# Patient Record
Sex: FEMALE | Race: AMERICAN INDIAN OR ALASKA NATIVE
[De-identification: names, ages, dates, MRNs, and addresses within clinical notes are randomized per-mention and may not be internally consistent; named-entity substitution may affect disease eponyms.]

---

## 2017-01-31 NOTE — MAMMOGRAPHY REPORT
RIGHT DIGITAL DIAGNOSTIC MAMMOGRAM : 01/31/17 09:27:00



CLINICAL: Recalled for asymmetry.



COMPARISON:12/27/16 screening



FINDINGS: Spot compression MLO and CC views were performed and 

demonstrated persistent upper-outer focal asymmetry. 



Ultrasound of the right breast was performed and demonstrated a cluster 

of cysts at 10 o'clock 12 cm from the nipple measuring 6 x 3 x 5 mm.  

It correlates with the mammographic density.





IMPRESSION: Benign cysts right breast.



BI-RADS CATEGORY:  2 - - Benign



RECOMMENDATION: Routine mammographic screening in one year.



ACR BI-RADS MAMMOGRAPHIC CODES:

0 = Needs additional imaging evaluation; 1 = Negative; 2 = Benign; 3 = 

Probably benign; 4 = Suspicious; 5 = Malignant; 6 = Known biopsy-proven 

malignancy



COMMENT:

      1.   Dense breast tissue, i.e., adenosis, fibrocystic 

            changes, etc., may obscure an underlying neoplasm.

      2.   Approximately 10% of cancers are not detected with

            mammography.

      3.   A negative mammography report should not delay biopsy 

            if a clinically suspicious mass is present.





COMMENT:

Patient follow-up letters are generated via our [employer] 

application.

## 2017-01-31 NOTE — ULTRASOUND REPORT
RIGHT DIGITAL DIAGNOSTIC MAMMOGRAM : 01/31/17 09:27:00



CLINICAL: Recalled for asymmetry.



COMPARISON:12/27/16 screening



FINDINGS: Spot compression MLO and CC views were performed and 

demonstrated persistent upper-outer focal asymmetry. 



Ultrasound of the right breast was performed and demonstrated a cluster 

of cysts at 10 o'clock 12 cm from the nipple measuring 6 x 3 x 5 mm.  

It correlates with the mammographic density.





IMPRESSION: Benign cysts right breast.



BI-RADS CATEGORY:  2 - - Benign



RECOMMENDATION: Routine mammographic screening in one year.



ACR BI-RADS MAMMOGRAPHIC CODES:

0 = Needs additional imaging evaluation; 1 = Negative; 2 = Benign; 3 = 

Probably benign; 4 = Suspicious; 5 = Malignant; 6 = Known biopsy-proven 

malignancy



COMMENT:

      1.   Dense breast tissue, i.e., adenosis, fibrocystic 

            changes, etc., may obscure an underlying neoplasm.

      2.   Approximately 10% of cancers are not detected with

            mammography.

      3.   A negative mammography report should not delay biopsy 

            if a clinically suspicious mass is present.





COMMENT:

Patient follow-up letters are generated via our [employer] 

application.

## 2017-06-05 NOTE — EMERGENCY DEPARTMENT REPORT
ED Upper Extremity Inj HPI





- General


Chief Complaint: Extremity Injury, Upper


Stated Complaint: Finger Lac


Time Seen by Provider: 06/05/17 09:15


Source: patient


Mode of arrival: Ambulatory


Limitations: No Limitations





- History of Present Illness


MD Complaint: Injury to:: left, finger


-: Sudden, This morning


Other Extremity Injury: Fingers: Left (1 cm lack medial aspect second finger)


Other Injuries: none


Place: work





- Related Data


 Allergies











Allergy/AdvReac Type Severity Reaction Status Date / Time


 


No Known Allergies Allergy   Verified 06/05/17 09:49














ED Review of Systems


ROS: 


Stated complaint: Finger Lac


Other details as noted in HPI





Constitutional: denies: chills, fever


Eyes: denies: eye pain, eye discharge, vision change


ENT: denies: ear pain, throat pain


Respiratory: denies: cough, shortness of breath, wheezing


Cardiovascular: denies: chest pain, palpitations


Endocrine: no symptoms reported


Gastrointestinal: denies: abdominal pain, nausea, diarrhea


Genitourinary: denies: urgency, dysuria, discharge


Musculoskeletal: denies: back pain, joint swelling, arthralgia


Skin: other (left second finger laceration).  denies: rash, lesions


Neurological: denies: headache, weakness, paresthesias


Psychiatric: denies: anxiety, depression


Hematological/Lymphatic: denies: easy bleeding, easy bruising





ED Past Medical Hx





- Past Medical History


Previous Medical History?: Yes


Hx Hypertension: Yes


Additional medical history: thyroid problems





- Surgical History


Past Surgical History?: Yes


Additional Surgical History: small bowel obstruction with lysisi of adhesions, 

Hysterectomy, Left knee surgery





- Social History


Smoking Status: Never Smoker


Substance Use Type: Alcohol, Prescribed





ED Physical Exam





- General


Limitations: No Limitations


General appearance: alert, in no apparent distress





- Head


Head exam: Present: atraumatic, normocephalic





- Eye


Eye exam: Present: normal appearance, PERRL, EOMI





- ENT


ENT exam: Present: mucous membranes moist





- Neck


Neck exam: Present: normal inspection





- Respiratory


Respiratory exam: Present: normal lung sounds bilaterally.  Absent: respiratory 

distress, wheezes, rales, rhonchi, stridor





- Cardiovascular


Cardiovascular Exam: Present: regular rate.  Absent: systolic murmur, diastolic 

murmur, rubs, gallop





- GI/Abdominal


GI/Abdominal exam: Present: soft, normal bowel sounds





- Extremities Exam


Extremities exam: Present: normal inspection





- Expanded Upper Extremity Exam


  ** Left


Hand Wrist exam: Present: other (left index finger laceration medial aspect 

neurovascular motor intact, brisk cap refill noted no deformity noted.)


Hand L/R Front: 


  __________________________














  __________________________





 1 - Positive: laceration








- Back Exam


Back exam: Present: normal inspection





- Neurological Exam


Neurological exam: Present: alert, oriented X3





- Psychiatric


Psychiatric exam: Present: normal affect, normal mood





- Skin


Skin exam: Present: warm, dry, intact, normal color.  Absent: rash





ED Course


 Vital Signs











  06/05/17





  09:05


 


Temperature 97.8 F


 


Pulse Rate 97 H


 


Respiratory 18





Rate 


 


Blood Pressure 126/82


 


O2 Sat by Pulse 98





Oximetry 














- Laceration /Wound Repair


  ** Left Medial Finger


Wound's Depth, Shape: superficial


Wound Explored: clean


Betadine Prep?: Yes


Anesthesia: 1% Lidocaine


Volume Anesthetic (ccs): 6


Wound Repaired With: sutures


Suture Size/Type: 4:0, proline


Number of Sutures: 1


Layer Closure?: No


Sterile Dressing Applied?: Yes


Critical care attestation.: 


If time is entered above; I have spent that time in minutes in the direct care 

of this critically ill patient, excluding procedure time.








ED Disposition


Clinical Impression: 


 Laceration of left index finger





Disposition: DISCHARGED TO HOME OR SELFCARE


Is pt being admited?: No


Condition: Stable


Instructions:  Suture Care (ED)


Referrals: 


PRIMARY CARE,MD [Primary Care Provider] - 3-5 Days


Forms:  Work/School Release Form(ED)

## 2017-06-05 NOTE — XRAY REPORT
LEFT HAND RADIOGRAPHS



INDICATION: Left hand index finger injury, slammed in a door.



COMPARISON: None similar.



FINDINGS: AP, lateral and oblique left hand radiographs demonstrate 

intact bones, joints and soft tissues.



CONCLUSION: No acute radiographic abnormality.



Thank you for the opportunity to participate in this patient's care.

## 2017-12-28 ENCOUNTER — HOSPITAL ENCOUNTER (OUTPATIENT)
Dept: HOSPITAL 5 - SPVWC | Age: 52
Discharge: HOME | End: 2017-12-28
Attending: INTERNAL MEDICINE
Payer: COMMERCIAL

## 2017-12-28 DIAGNOSIS — I10: ICD-10-CM

## 2017-12-28 DIAGNOSIS — Z12.31: Primary | ICD-10-CM

## 2017-12-28 PROCEDURE — 77067 SCR MAMMO BI INCL CAD: CPT

## 2017-12-28 PROCEDURE — G0202 SCR MAMMO BI INCL CAD: HCPCS

## 2017-12-29 ENCOUNTER — HOSPITAL ENCOUNTER (OUTPATIENT)
Dept: HOSPITAL 5 - GIO | Age: 52
Discharge: HOME | End: 2017-12-29
Attending: INTERNAL MEDICINE
Payer: COMMERCIAL

## 2017-12-29 VITALS — SYSTOLIC BLOOD PRESSURE: 132 MMHG | DIASTOLIC BLOOD PRESSURE: 65 MMHG

## 2017-12-29 DIAGNOSIS — K64.8: ICD-10-CM

## 2017-12-29 DIAGNOSIS — I10: ICD-10-CM

## 2017-12-29 DIAGNOSIS — E03.9: ICD-10-CM

## 2017-12-29 DIAGNOSIS — K63.89: ICD-10-CM

## 2017-12-29 DIAGNOSIS — K57.30: ICD-10-CM

## 2017-12-29 DIAGNOSIS — Z79.899: ICD-10-CM

## 2017-12-29 DIAGNOSIS — Z12.11: Primary | ICD-10-CM

## 2017-12-29 PROCEDURE — 82962 GLUCOSE BLOOD TEST: CPT

## 2017-12-29 PROCEDURE — 45378 DIAGNOSTIC COLONOSCOPY: CPT

## 2017-12-29 NOTE — MAMMOGRAPHY REPORT
BILATERAL DIGITAL SCREENING MAMMOGRAM with CAD: 12/28/17 08:02:00



CLINICAL: Routine screening.



COMPARISON:01/31/17, 12/27/16 and 08/26/11



FINDINGS: The breasts are mostly fatty with bilateral upper outer 

residual fibroglandular densities.  New right upper outer parenchymal 

asymmetries require additional evaluation.No architectural distortion 

or suspicious calcifications.The left breast is negative.



IMPRESSION:  New right asymmetries requiring further workup.  



BI-RADS CATEGORY:  0 -- Additional Imaging Evaluation Required



RECOMMENDATION: Recall for right mediolateral , spot magnification  CC 

and MLO views and right breast ultrasound if needed.



ACR BI-RADS MAMMOGRAPHIC CODES:

0 = Needs additional imaging evaluation; 1 = Negative; 2 = Benign; 3 = 

Probably benign; 4 = Suspicious; 5 = Malignant; 6 = Known biopsy-proven 

malignancy



COMMENT:

      1.   Dense breast tissue, i.e., adenosis, fibrocystic 

            changes, etc., may obscure an underlying neoplasm.

      2.   Approximately 10% of cancers are not detected with

            mammography.

      3.   A negative mammography report should not delay biopsy 

            if a clinically suspicious mass is present.



COMMENT:

Patient follow-up letters are generated via our [employer] application.

## 2017-12-29 NOTE — OPERATIVE REPORT
PRIMARY CARE:  Claire Ladd DO



PROCEDURE:  Colonoscopy.



INDICATION:  Colon cancer screening.



MEDICATIONS:  Propofol per CRNA.



COMPLICATIONS:  None.



DESCRIPTION OF PROCEDURE:  The patient brought to procedure suite.  The patient

had the procedure discussed with her at length.  All risks, complications, and

benefits discussed after which the patient signed for the procedure performed. 

The patient was placed in left lateral decubitus position.  Rectal exam

performed prior to insertion of the scope.  After adequate sedation medication

as above, scope was inserted in the rectum and brought to the level of the

cecum.  Ileocecal valve, appendiceal orifice and cecal strap adequately

visualized.  Colonoscope was then removed and mucosa of colon visualized.  Prep

quality for this procedure was fair.  The patient's vital signs remained stable

throughout the procedure.



FINDINGS:  There were no mass lesions or polyps noted during this procedure. 

There was noted to be severe melanosis coli throughout the colon.  There were

few small sigmoid diverticula noted.  Retroflexion view performed in the rectum

showed small to medium internal hemorrhoids.  The patient tolerated the

procedure well.  No complications during the procedure.



IMPRESSION:

1. Melanosis coli.

2. Diverticulosis.

3. Internal hemorrhoids.



RECOMMENDATIONS:

1. High fiber diet.

2. Continue current medications.

3. Repeat colonoscopy screening in 10 years.





DD: 12/29/2017 14:15

DT: 12/29/2017 20:49

JOB# [id number]  [id number]

CAB/NTS

## 2017-12-29 NOTE — ANESTHESIA CONSULTATION
Anesthesia Consult and Med Hx


Date of service: 12/29/17





- Airway


Anesthetic Teeth Evaluation: Good


ROM Head & Neck: Adequate


Mental/Hyoid Distance: Adequate


Mallampati Class: Class I


Intubation Access Assessment: Good





- Pulmonary Exam


CTA: Yes





- Cardiac Exam


Cardiac Exam: RRR





- Pre-Operative Health Status


ASA Pre-Surgery Classification: ASA3


Proposed Anesthetic Plan: MAC





- Pulmonary


Hx Smoking: No


Hx Sleep Apnea: No





- Cardiovascular System


Hx Hypertension: Yes





- Central Nervous System


Hx Psychiatric Problems: No





- Endocrine


Hx Non-Insulin Dependent Diabetes: Yes


Hx Hypothyroidism: Yes





- Hematic


Hx Anemia: Yes (RESOLVED, ONLY DURING PREGNANCY)





- Other Systems


Hx Cancer: No

## 2018-02-09 ENCOUNTER — HOSPITAL ENCOUNTER (OUTPATIENT)
Dept: HOSPITAL 5 - SPVWC | Age: 53
Discharge: HOME | End: 2018-02-09
Attending: INTERNAL MEDICINE
Payer: COMMERCIAL

## 2018-02-09 DIAGNOSIS — R92.8: Primary | ICD-10-CM

## 2018-02-09 NOTE — MAMMOGRAPHY REPORT
RIGHT DIGITAL DIAGNOSTIC MAMMOGRAM : 02/09/18 09:30:00



CLINICAL: Recalled for asymmetry.



COMPARISON:12/28/17 screening



FINDINGS: Lateralmedial and spot magnification MLO the CC views were 

performed. Satisfactory effacement of upper outer parenchymal asymmetry 

on the spot views and negative lateral view.





IMPRESSION: Negative Mammogram.



BI-RADS CATEGORY:  1 -- Negative



RECOMMENDATION: Routine mammographic screening in one year.



ACR BI-RADS MAMMOGRAPHIC CODES:

0 = Needs additional imaging evaluation; 1 = Negative; 2 = Benign; 3 = 

Probably benign; 4 = Suspicious; 5 = Malignant; 6 = Known biopsy-proven 

malignancy



COMMENT:

      1.   Dense breast tissue, i.e., adenosis, fibrocystic 

            changes, etc., may obscure an underlying neoplasm.

      2.   Approximately 10% of cancers are not detected with

            mammography.

      3.   A negative mammography report should not delay biopsy 

            if a clinically suspicious mass is present.





COMMENT:

Patient follow-up letters are generated via our [employer] 

application.

## 2018-05-14 ENCOUNTER — HOSPITAL ENCOUNTER (OUTPATIENT)
Dept: HOSPITAL 5 - SLR | Age: 53
Discharge: HOME | End: 2018-05-14
Attending: OTOLARYNGOLOGY
Payer: COMMERCIAL

## 2018-05-14 DIAGNOSIS — G47.30: Primary | ICD-10-CM

## 2018-05-14 PROCEDURE — 95810 POLYSOM 6/> YRS 4/> PARAM: CPT

## 2018-10-21 ENCOUNTER — HOSPITAL ENCOUNTER (EMERGENCY)
Dept: HOSPITAL 5 - ED | Age: 53
Discharge: HOME | End: 2018-10-21
Payer: COMMERCIAL

## 2018-10-21 VITALS — DIASTOLIC BLOOD PRESSURE: 90 MMHG | SYSTOLIC BLOOD PRESSURE: 127 MMHG

## 2018-10-21 DIAGNOSIS — E11.9: ICD-10-CM

## 2018-10-21 DIAGNOSIS — Z90.710: ICD-10-CM

## 2018-10-21 DIAGNOSIS — I10: ICD-10-CM

## 2018-10-21 DIAGNOSIS — M17.12: Primary | ICD-10-CM

## 2018-10-21 DIAGNOSIS — Z91.048: ICD-10-CM

## 2018-10-21 PROCEDURE — 99283 EMERGENCY DEPT VISIT LOW MDM: CPT

## 2018-10-21 NOTE — XRAY REPORT
FINAL REPORT



EXAM:  XR KNEE 3V LT



HISTORY:  left knee pain/pop sensation 



TECHNIQUE:  Three views of the left knee 



PRIORS:  None.



FINDINGS:  

The bones are normally aligned and mineralized. There is

prominent osteophyte formation of the patellofemoral and medial

joints. There is mild narrowing of the medial joint and moderate

narrowing of the patellofemoral joint. There is beaking of the

tibial spines. There is no evidence of acute fracture. The soft

tissues are unremarkable.



IMPRESSION:  

No evidence of acute fracture or subluxation.



Osteoarthrosis of the medial and patellofemoral joints.

## 2018-10-21 NOTE — EMERGENCY DEPARTMENT REPORT
ED Lower Extremity HPI





- General


Chief Complaint: Extremity Injury, Lower


Stated Complaint: LEFT KNEE PAIN


Time Seen by Provider: 10/21/18 17:55


Source: patient


Mode of arrival: Ambulatory


Limitations: No Limitations





- History of Present Illness


Initial Comments: 





Patient reports left knee pain with a "popping sensation" that gave out this 

morning





- Related Data


 Home Medications











 Medication  Instructions  Recorded  Confirmed  Last Taken


 


Thyroid,Pork [Nature-Throid] 90 mg PO QDAY 12/29/17 12/29/17 12/28/17 14:00


 


Triamterene/Hydrochlorothiazid 1 each PO DAILY 12/29/17 12/29/17 12/28/17 14:00





[Triamterene-Hctz 37.5-25 mg Cp]    


 


amLODIPine [Norvasc] 1 tab PO DAILY 12/29/17 12/29/17 12/28/17 14:00








 Previous Rx's











 Medication  Instructions  Recorded  Last Taken  Type


 


Acetaminophen [Tylenol Extra 1 gm PO Q6H #30 tablet 10/21/18 Unknown Rx





Strength]    











 Allergies











Allergy/AdvReac Type Severity Reaction Status Date / Time


 


adhesive tape Allergy  Rash Verified 10/21/18 17:14














ED Review of Systems


ROS: 


Stated complaint: LEFT KNEE PAIN


Other details as noted in HPI





Constitutional: denies: chills, fever


Eyes: denies: eye pain, eye discharge, vision change


ENT: denies: ear pain, throat pain


Respiratory: denies: cough, shortness of breath, wheezing


Cardiovascular: denies: chest pain, palpitations


Endocrine: no symptoms reported


Gastrointestinal: denies: abdominal pain, nausea, diarrhea


Genitourinary: denies: urgency, dysuria, discharge


Musculoskeletal: arthralgia (left knee).  denies: back pain, joint swelling


Skin: denies: rash, lesions


Neurological: denies: headache, weakness, paresthesias


Psychiatric: denies: anxiety, depression


Hematological/Lymphatic: denies: easy bleeding, easy bruising





ED Past Medical Hx





- Past Medical History


Hx Hypertension: Yes


Hx Diabetes: Yes (type II, no meds 2007)


Additional medical history: thyroid problems, Arthritis





- Surgical History


Additional Surgical History: small bowel obstruction with lysisi of adhesions, 

Hysterectomy, Left knee surgery





- Social History


Smoking Status: Never Smoker


Substance Use Type: Alcohol





- Medications


Home Medications: 


 Home Medications











 Medication  Instructions  Recorded  Confirmed  Last Taken  Type


 


Thyroid,Pork [Nature-Throid] 90 mg PO QDAY 12/29/17 12/29/17 12/28/17 14:00 

History


 


Triamterene/Hydrochlorothiazid 1 each PO DAILY 12/29/17 12/29/17 12/28/17 14:00 

History





[Triamterene-Hctz 37.5-25 mg Cp]     


 


amLODIPine [Norvasc] 1 tab PO DAILY 12/29/17 12/29/17 12/28/17 14:00 History


 


Acetaminophen [Tylenol Extra 1 gm PO Q6H #30 tablet 10/21/18  Unknown Rx





Strength]     














ED Physical Exam





- General


Limitations: No Limitations


General appearance: alert, in no apparent distress





- Neck


Neck exam: Present: normal inspection, full ROM.  Absent: tenderness, 

meningismus, lymphadenopathy, thyromegaly





- Respiratory


Respiratory exam: Present: normal lung sounds bilaterally.  Absent: respiratory 

distress, wheezes, rales, rhonchi, stridor, chest wall tenderness, accessory 

muscle use, decreased breath sounds, prolonged expiratory





- Cardiovascular


Cardiovascular Exam: Present: regular rate, normal rhythm, normal heart sounds.

  Absent: systolic murmur, diastolic murmur, rubs, gallop





- Extremities Exam


Extremities exam: Present: normal inspection, full ROM, normal capillary 

refill.  Absent: tenderness, pedal edema, joint swelling





- Expanded Lower Extremity Exam


  ** Left


Hip exam: Present: normal inspection, full ROM, pelvic stability


Upper Leg exam: Present: normal inspection, full ROM


Knee exam: Present: full ROM, tenderness (left medial), swelling, full knee 

extension.  Absent: abrasion, laceration, ecchymosis, deformity, crepidus, 

dislocation, erythema, posterior draw sign, pain/laxity with valgus, pain/

laxity with varus


Lower Leg exam: Present: normal inspection, full ROM


Ankle exam: Present: normal inspection, full ROM


Foot/Toe exam: Present: normal inspection, full ROM


Neuro vascular tendon exam: Present: no vascular compromise, significant pain 

with passive ROM of distal joint.  Absent: pulse deficit, abnormal cap refill, 

motor deficit, sensory deficit, tendon deficit, extremity cold to touch, pallor

, abnormal 2-point discrimination, decreased fine/light touch, foot drop, 

peroneal nerve deficit


Gait: Positive: observed and limited by pain





- Back Exam


Back exam: Present: normal inspection, full ROM.  Absent: tenderness, CVA 

tenderness (R), CVA tenderness (L)





- Neurological Exam


Neurological exam: Present: alert, oriented X3, CN II-XII intact, normal gait, 

reflexes normal.  Absent: motor sensory deficit





- Psychiatric


Psychiatric exam: Present: normal affect, normal mood





- Skin


Skin exam: Present: warm, dry, intact, normal color.  Absent: rash





ED Course


 Vital Signs











  10/21/18 10/21/18





  17:15 20:12


 


Temperature 98.3 F 98.1 F


 


Pulse Rate 83 86


 


Respiratory 18 18





Rate  


 


Blood Pressure 128/78 


 


Blood Pressure  127/90





[Left]  


 


O2 Sat by Pulse 98 99





Oximetry  














ED Lower Extremity MDM





- Lab Data








 Vital Signs











  10/21/18 10/21/18





  17:15 20:12


 


Temperature 98.3 F 98.1 F


 


Pulse Rate 83 86


 


Respiratory 18 18





Rate  


 


Blood Pressure 128/78 


 


Blood Pressure  127/90





[Left]  


 


O2 Sat by Pulse 98 99





Oximetry  














- Radiology Data


Radiology results: image reviewed





EXAM: XR KNEE 3V LT 





 HISTORY: left knee pain/pop sensation 





 TECHNIQUE: Three views of the left knee 





 PRIORS: None. 





 FINDINGS: 


 The bones are normally aligned and mineralized. There is 


 prominent osteophyte formation of the patellofemoral and medial 


 joints. There is mild narrowing of the medial joint and moderate 


 narrowing of the patellofemoral joint. There is beaking of the 


 tibial spines. There is no evidence of acute fracture. The soft 


 tissues are unremarkable. 





 IMPRESSION: 


 No evidence of acute fracture or subluxation. 





 Osteoarthrosis of the medial and patellofemoral joints. 





- Medical Decision Making





During the course of ED, radiology studies were ordered. The imaging studies 

revealed No evidence of acute fracture or subluxation. Osteoarthrosis of the 

medial and patellofemoral joints. She was sent home with prescription for 

Tylenol ES, instructed to follow up with the selective referral given at 

discharge. She verbalized understanding





- Differential Diagnosis


Left Knee Pain, Osteoarthritis


Critical care attestation.: 


If time is entered above; I have spent that time in minutes in the direct care 

of this critically ill patient, excluding procedure time.








ED Disposition


Clinical Impression: 


Left knee pain


Qualifiers:


 Chronicity: acute Qualified Code(s): M25.562 - Pain in left knee





Osteoarthritis of left knee


Qualifiers:


 Osteoarthritis type: unspecified Qualified Code(s): M17.12 - Unilateral 

primary osteoarthritis, left knee





Disposition: DC-01 TO HOME OR SELFCARE


Is pt being admited?: No


Does the pt Need Aspirin: No


Condition: Stable


Instructions:  Arthralgia (ED)


Additional Instructions: 


Take medication as directed. Follow up with the selective referral given at 

discharge


Prescriptions: 


Acetaminophen [Tylenol Extra Strength] 1 gm PO Q6H #30 tablet


Referrals: 


JA LARSON MD [Staff Physician] - 3-5 Days


Forms:  Work/School Release Form(ED)


Time of Disposition: 19:48

## 2019-04-03 ENCOUNTER — HOSPITAL ENCOUNTER (OUTPATIENT)
Dept: HOSPITAL 5 - LAB | Age: 54
Discharge: HOME | End: 2019-04-03
Attending: SPECIALIST
Payer: COMMERCIAL

## 2019-04-03 DIAGNOSIS — Z90.710: ICD-10-CM

## 2019-04-03 DIAGNOSIS — Z01.812: Primary | ICD-10-CM

## 2019-04-03 DIAGNOSIS — I10: ICD-10-CM

## 2019-04-03 DIAGNOSIS — E66.01: ICD-10-CM

## 2019-04-03 DIAGNOSIS — Z90.721: ICD-10-CM

## 2019-04-03 DIAGNOSIS — E03.9: ICD-10-CM

## 2019-04-03 DIAGNOSIS — E11.9: ICD-10-CM

## 2019-04-03 LAB
ALBUMIN SERPL-MCNC: 4.5 G/DL (ref 3.9–5)
ALT SERPL-CCNC: 20 UNITS/L (ref 7–56)
BASOPHILS # (AUTO): 0 K/MM3 (ref 0–0.1)
BASOPHILS NFR BLD AUTO: 0.3 % (ref 0–1.8)
BUN SERPL-MCNC: 31 MG/DL (ref 7–17)
BUN/CREAT SERPL: 39 %
CALCIUM SERPL-MCNC: 9.7 MG/DL (ref 8.4–10.2)
EOSINOPHIL # BLD AUTO: 0.1 K/MM3 (ref 0–0.4)
EOSINOPHIL NFR BLD AUTO: 1.2 % (ref 0–4.3)
HCT VFR BLD CALC: 42.3 % (ref 30.3–42.9)
HDLC SERPL-MCNC: 62 MG/DL (ref 40–59)
HEMOLYSIS INDEX: 2
HGB BLD-MCNC: 13.9 GM/DL (ref 10.1–14.3)
LYMPHOCYTES # BLD AUTO: 2.5 K/MM3 (ref 1.2–5.4)
LYMPHOCYTES NFR BLD AUTO: 38.5 % (ref 13.4–35)
MCHC RBC AUTO-ENTMCNC: 33 % (ref 30–34)
MCV RBC AUTO: 84 FL (ref 79–97)
MONOCYTES # (AUTO): 0.4 K/MM3 (ref 0–0.8)
MONOCYTES % (AUTO): 6.6 % (ref 0–7.3)
PLATELET # BLD: 307 K/MM3 (ref 140–440)
RBC # BLD AUTO: 5.06 M/MM3 (ref 3.65–5.03)

## 2019-04-03 PROCEDURE — 36415 COLL VENOUS BLD VENIPUNCTURE: CPT

## 2019-04-03 PROCEDURE — 84443 ASSAY THYROID STIM HORMONE: CPT

## 2019-04-03 PROCEDURE — 80053 COMPREHEN METABOLIC PANEL: CPT

## 2019-04-03 PROCEDURE — 85025 COMPLETE CBC W/AUTO DIFF WBC: CPT

## 2019-04-03 PROCEDURE — 83036 HEMOGLOBIN GLYCOSYLATED A1C: CPT

## 2019-04-03 PROCEDURE — 80061 LIPID PANEL: CPT

## 2019-04-09 ENCOUNTER — HOSPITAL ENCOUNTER (OUTPATIENT)
Dept: HOSPITAL 5 - GIO | Age: 54
Discharge: HOME | End: 2019-04-09
Attending: SPECIALIST
Payer: COMMERCIAL

## 2019-04-09 VITALS — SYSTOLIC BLOOD PRESSURE: 105 MMHG | DIASTOLIC BLOOD PRESSURE: 55 MMHG

## 2019-04-09 DIAGNOSIS — E11.9: ICD-10-CM

## 2019-04-09 DIAGNOSIS — Z88.8: ICD-10-CM

## 2019-04-09 DIAGNOSIS — K30: Primary | ICD-10-CM

## 2019-04-09 DIAGNOSIS — I10: ICD-10-CM

## 2019-04-09 DIAGNOSIS — E03.9: ICD-10-CM

## 2019-04-09 DIAGNOSIS — Z86.2: ICD-10-CM

## 2019-04-09 DIAGNOSIS — K44.9: ICD-10-CM

## 2019-04-09 DIAGNOSIS — Z98.890: ICD-10-CM

## 2019-04-09 DIAGNOSIS — Z79.899: ICD-10-CM

## 2019-04-09 DIAGNOSIS — E66.01: ICD-10-CM

## 2019-04-09 DIAGNOSIS — Z90.710: ICD-10-CM

## 2019-04-09 DIAGNOSIS — Z90.721: ICD-10-CM

## 2019-04-09 PROCEDURE — 82962 GLUCOSE BLOOD TEST: CPT

## 2019-04-09 PROCEDURE — 43235 EGD DIAGNOSTIC BRUSH WASH: CPT

## 2019-04-09 NOTE — OPERATIVE REPORT
PREOPERATIVE DIAGNOSIS:  Morbid obesity.





POSTOPERATIVE DIAGNOSIS:  Small hiatal hernia, type 1.





PROCEDURE:  EGD.





SPECIMENS:  None.





BLEEDING:  None.





COMPLICATIONS:  None.





INDICATIONS FOR PROCEDURE:  The patient is a 53-year-old female with history of

morbid obesity.  She is here for preoperative EGD.  Informed consent had been

obtained.





DESCRIPTION OF PROCEDURE:  The patient was brought to the operating suite where

she was placed in the left lateral decubitus position underwent MAC anesthesia. 

A bite block was placed and a time-out was called.  A standard adult gastroscope

was inserted into the oropharynx, down the esophagus, into the first portion of

the duodenum.  On retroflexion view, she was noted to have a small type 1

sliding hiatal hernia.  There were no other abnormalities.  With this, the air

was desufflated.  The gastroscope was removed.  The patient tolerated the

procedure well and was transferred to the PACU in stable condition.





DD: 04/09/2019 11:09

DT: 04/09/2019 12:35

JOB# [id number]  [id number]

BRANDON/DEVIN

## 2019-04-15 NOTE — ANESTHESIA CONSULTATION
Anesthesia Consult and Med Hx


Date of service: 04/16/19





- Airway


Anesthetic Teeth Evaluation: Good


ROM Head & Neck: Adequate


Mental/Hyoid Distance: Adequate


Mallampati Class: Class I


Intubation Access Assessment: Probably Good





- Pulmonary Exam


CTA: Yes





- Cardiac Exam


Cardiac Exam: RRR





- Pre-Operative Health Status


ASA Pre-Surgery Classification: ASA3


Proposed Anesthetic Plan: General





- Pulmonary


Hx Smoking: No


Hx Sleep Apnea: No (SLEEP STUDY NEG)





- Cardiovascular System


Hx Hypertension: Yes (2007)


Hx Heart Attack/AMI: No


Hx Cardia Arrhythmia: No


Hx Peripheral Vascular Disease: No





- Central Nervous System


Hx Neuromuscular Disorder: No


Hx Back Pain: Yes (LOWER)


Hx Psychiatric Problems: No





- Gastrointestinal


Hx Gastroesophageal Reflux Disease: No (patient with small hiatal hernia)





- Endocrine


Hx Renal Disease: No


Hx Liver Disease: No


Hx Non-Insulin Dependent Diabetes: Yes (diet-controlled )


Hx Hypothyroidism: Yes





- Hematic


Hx Anemia: Yes (RESOLVED, ONLY DURING PREGNANCY)





- Other Systems


Hx Alcohol Use: Yes


Hx Substance Use: No


Hx Cancer: No


Hx Obesity: Yes





- Additional Comments


Anesthesia Medical History Comments: No GAC, No FHAC; advised to take thyroid 

and antihypertensive (amlodipine) as scheduled the morning of surgery.

## 2019-04-16 ENCOUNTER — HOSPITAL ENCOUNTER (OUTPATIENT)
Dept: HOSPITAL 5 - OR | Age: 54
Setting detail: OBSERVATION
LOS: 1 days | Discharge: HOME | End: 2019-04-17
Attending: SPECIALIST | Admitting: SURGERY
Payer: COMMERCIAL

## 2019-04-16 DIAGNOSIS — E03.9: ICD-10-CM

## 2019-04-16 DIAGNOSIS — E66.01: Primary | ICD-10-CM

## 2019-04-16 DIAGNOSIS — R06.02: ICD-10-CM

## 2019-04-16 DIAGNOSIS — Z90.711: ICD-10-CM

## 2019-04-16 DIAGNOSIS — K44.9: ICD-10-CM

## 2019-04-16 DIAGNOSIS — K30: ICD-10-CM

## 2019-04-16 DIAGNOSIS — Z98.890: ICD-10-CM

## 2019-04-16 DIAGNOSIS — E11.9: ICD-10-CM

## 2019-04-16 DIAGNOSIS — I10: ICD-10-CM

## 2019-04-16 DIAGNOSIS — Z91.048: ICD-10-CM

## 2019-04-16 PROCEDURE — 43775 LAP SLEEVE GASTRECTOMY: CPT

## 2019-04-16 PROCEDURE — 96374 THER/PROPH/DIAG INJ IV PUSH: CPT

## 2019-04-16 PROCEDURE — G0378 HOSPITAL OBSERVATION PER HR: HCPCS

## 2019-04-16 PROCEDURE — 36415 COLL VENOUS BLD VENIPUNCTURE: CPT

## 2019-04-16 PROCEDURE — 80053 COMPREHEN METABOLIC PANEL: CPT

## 2019-04-16 PROCEDURE — 96372 THER/PROPH/DIAG INJ SC/IM: CPT

## 2019-04-16 PROCEDURE — 88307 TISSUE EXAM BY PATHOLOGIST: CPT

## 2019-04-16 PROCEDURE — 96375 TX/PRO/DX INJ NEW DRUG ADDON: CPT

## 2019-04-16 PROCEDURE — 93010 ELECTROCARDIOGRAM REPORT: CPT

## 2019-04-16 PROCEDURE — 82962 GLUCOSE BLOOD TEST: CPT

## 2019-04-16 PROCEDURE — 94760 N-INVAS EAR/PLS OXIMETRY 1: CPT

## 2019-04-16 PROCEDURE — 96376 TX/PRO/DX INJ SAME DRUG ADON: CPT

## 2019-04-16 PROCEDURE — 93005 ELECTROCARDIOGRAM TRACING: CPT

## 2019-04-16 PROCEDURE — 43281 LAP PARAESOPHAG HERN REPAIR: CPT

## 2019-04-16 PROCEDURE — 85025 COMPLETE CBC W/AUTO DIFF WBC: CPT

## 2019-04-16 RX ADMIN — SIMETHICONE PRN MG: 80 TABLET, CHEWABLE ORAL at 21:34

## 2019-04-16 RX ADMIN — HYDROCODONE BITARTRATE AND ACETAMINOPHEN PRN MG: 7.5; 325 SOLUTION ORAL at 17:36

## 2019-04-16 RX ADMIN — ONDANSETRON PRN MG: 2 INJECTION INTRAMUSCULAR; INTRAVENOUS at 23:08

## 2019-04-16 RX ADMIN — SODIUM CHLORIDE, SODIUM LACTATE, POTASSIUM CHLORIDE, AND CALCIUM CHLORIDE SCH MLS/HR: .6; .31; .03; .02 INJECTION, SOLUTION INTRAVENOUS at 23:12

## 2019-04-16 RX ADMIN — HYDROMORPHONE HYDROCHLORIDE PRN MG: 1 INJECTION, SOLUTION INTRAMUSCULAR; INTRAVENOUS; SUBCUTANEOUS at 19:18

## 2019-04-16 RX ADMIN — FENTANYL CITRATE PRN MCG: 50 INJECTION, SOLUTION INTRAMUSCULAR; INTRAVENOUS at 16:56

## 2019-04-16 RX ADMIN — KETOROLAC TROMETHAMINE SCH: 30 INJECTION, SOLUTION INTRAMUSCULAR at 19:44

## 2019-04-16 RX ADMIN — SIMETHICONE PRN MG: 80 TABLET, CHEWABLE ORAL at 17:33

## 2019-04-16 RX ADMIN — HYDROMORPHONE HYDROCHLORIDE PRN MG: 1 INJECTION, SOLUTION INTRAMUSCULAR; INTRAVENOUS; SUBCUTANEOUS at 23:08

## 2019-04-16 RX ADMIN — FENTANYL CITRATE PRN MCG: 50 INJECTION, SOLUTION INTRAMUSCULAR; INTRAVENOUS at 17:10

## 2019-04-16 RX ADMIN — KETOROLAC TROMETHAMINE SCH MG: 30 INJECTION, SOLUTION INTRAMUSCULAR at 23:23

## 2019-04-16 RX ADMIN — METOCLOPRAMIDE PRN MG: 5 INJECTION, SOLUTION INTRAMUSCULAR; INTRAVENOUS at 17:39

## 2019-04-16 NOTE — OPERATIVE REPORT
PREOPERATIVE DIAGNOSIS:  Morbid obesity.



POSTOPERATIVE DIAGNOSIS:  Morbid obesity.



OPERATION:

1. Laparoscopic sleeve gastrectomy.

2. Laparoscopic hiatal hernia repair.



ANESTHESIA:  General endotracheal anesthesia.



COMPLICATIONS:  None.



BLEEDING:  Minimal.



SPECIMENS:  Gastric remnant.



INDICATIONS:  The patient is a 53-year-old female with a history of morbid

obesity, refractory to medical management.  She is here for her weight loss

surgery.  She underwent preoperative bariatric workup.  The risks, complications

and alternatives were explained to the patient and informed consent was

obtained.



DESCRIPTION OF PROCEDURE:  The patient was brought to the operating suite where

she was placed in the supine position and underwent general endotracheal

intubation.  She received preoperative antibiotics and DVT prophylaxis.  She was

prepped and draped in the usual sterile fashion and then a timeout was called to

ensure proper patient, indication and operation.  Local was injected around the

umbilical region and a small stab incision was made at the base of the umbilicus

with insertion of a Veress needle.  Insufflation pressures were achieved to 18

mmHg and the incision was widened and exchanged for a 15 mm trocar.  On

intra-abdominal view there was no injury.  There was a minor injury to the

falciform ligament.  A 5 mm ports were placed in the right lateral subxiphoid

and left lateral quadrants.  The ligament was cauterized with LigaSure device

for hemostasis.  After this, liver retractor was placed and the patient was

repositioned in steep reverse Trendelenburg.  She had a small hiatal hernia that

was noted.  A hiatal dissection was then performed to expose the right and left

néstor.  The angle of His was dissected free and then the greater curvature of the

stomach was divided from the gastrocolic ligament utilizing LigaSure device

fully mobilizing the stomach and continued anterograde for about 6 cm from the

duodenum.  Anesthesia then placed a 40-[place]at[place] bougie for calibration and a

sleeve gastrectomy was then performed utilizing several staple loads. 

Insufflation pressures were decreased and the staple line was cauterized after

each fire for hemostasis.  After this, an anterior cruroplasty was then

performed with 0 Surgidac suture in a U-stitch fashion.  A 250 mL of saline was

then left in the left and right upper quadrant and the gastric remnant was

removed from the umbilical port site.  The fascia was closed with #1 PDS in a

figure-of-eight fashion and Villa-Jostin fashion.  Then, the air was

desufflated.  The ports were removed.  Additional local was injected to all the

wounds and the wounds were closed with 4-0 Monocryl.  Sterile bandages were

placed over top.  The patient tolerated the procedure well, was then transferred

to the PACU in stable condition.  Counts were correct.





DD: 04/16/2019 16:32

DT: 04/16/2019 16:50

JOB# [id number]  [id number]

MCL/NTS

MTDD

## 2019-04-17 VITALS — SYSTOLIC BLOOD PRESSURE: 95 MMHG | DIASTOLIC BLOOD PRESSURE: 53 MMHG

## 2019-04-17 LAB
ALBUMIN SERPL-MCNC: 3.6 G/DL (ref 3.9–5)
ALT SERPL-CCNC: 17 UNITS/L (ref 7–56)
BASOPHILS # (AUTO): 0 K/MM3 (ref 0–0.1)
BASOPHILS NFR BLD AUTO: 0.1 % (ref 0–1.8)
BUN SERPL-MCNC: 13 MG/DL (ref 7–17)
BUN/CREAT SERPL: 22 %
CALCIUM SERPL-MCNC: 8.8 MG/DL (ref 8.4–10.2)
EOSINOPHIL # BLD AUTO: 0 K/MM3 (ref 0–0.4)
EOSINOPHIL NFR BLD AUTO: 0 % (ref 0–4.3)
HCT VFR BLD CALC: 34.4 % (ref 30.3–42.9)
HEMOLYSIS INDEX: 1
HGB BLD-MCNC: 11.6 GM/DL (ref 10.1–14.3)
LYMPHOCYTES # BLD AUTO: 0.5 K/MM3 (ref 1.2–5.4)
LYMPHOCYTES NFR BLD AUTO: 7.2 % (ref 13.4–35)
MCHC RBC AUTO-ENTMCNC: 34 % (ref 30–34)
MCV RBC AUTO: 83 FL (ref 79–97)
MONOCYTES # (AUTO): 0.4 K/MM3 (ref 0–0.8)
MONOCYTES % (AUTO): 5.2 % (ref 0–7.3)
PLATELET # BLD: 246 K/MM3 (ref 140–440)
RBC # BLD AUTO: 4.14 M/MM3 (ref 3.65–5.03)

## 2019-04-17 RX ADMIN — KETOROLAC TROMETHAMINE SCH MG: 30 INJECTION, SOLUTION INTRAMUSCULAR at 11:32

## 2019-04-17 RX ADMIN — HYDROMORPHONE HYDROCHLORIDE PRN MG: 1 INJECTION, SOLUTION INTRAMUSCULAR; INTRAVENOUS; SUBCUTANEOUS at 08:25

## 2019-04-17 RX ADMIN — HYDROMORPHONE HYDROCHLORIDE PRN MG: 1 INJECTION, SOLUTION INTRAMUSCULAR; INTRAVENOUS; SUBCUTANEOUS at 02:53

## 2019-04-17 RX ADMIN — HYDROMORPHONE HYDROCHLORIDE PRN MG: 1 INJECTION, SOLUTION INTRAMUSCULAR; INTRAVENOUS; SUBCUTANEOUS at 14:43

## 2019-04-17 RX ADMIN — ONDANSETRON PRN MG: 2 INJECTION INTRAMUSCULAR; INTRAVENOUS at 08:23

## 2019-04-17 RX ADMIN — HYDROCODONE BITARTRATE AND ACETAMINOPHEN PRN MG: 7.5; 325 SOLUTION ORAL at 11:34

## 2019-04-17 RX ADMIN — HYDROMORPHONE HYDROCHLORIDE PRN MG: 1 INJECTION, SOLUTION INTRAMUSCULAR; INTRAVENOUS; SUBCUTANEOUS at 03:01

## 2019-04-17 RX ADMIN — SODIUM CHLORIDE, SODIUM LACTATE, POTASSIUM CHLORIDE, AND CALCIUM CHLORIDE SCH MLS/HR: .6; .31; .03; .02 INJECTION, SOLUTION INTRAVENOUS at 05:33

## 2019-04-17 RX ADMIN — SIMETHICONE PRN MG: 80 TABLET, CHEWABLE ORAL at 03:00

## 2019-04-17 RX ADMIN — METOCLOPRAMIDE PRN MG: 5 INJECTION, SOLUTION INTRAMUSCULAR; INTRAVENOUS at 02:53

## 2019-04-17 RX ADMIN — KETOROLAC TROMETHAMINE SCH MG: 30 INJECTION, SOLUTION INTRAMUSCULAR at 05:34

## 2019-04-17 NOTE — DISCHARGE SUMMARY
Providers





- Providers


Date of Admission: 


04/16/19 16:33





Date of discharge: 04/17/19


Attending physician: 


HEYDI HARDING





Primary care physician: 


KAYKAY FONTANA








Hospitalization


Reason for admission: postop monitoring


Condition: Good


Procedures: 





4/16/19: Laparoscopic sleeve gastrectomy


Hospital course: 





53F admitted after her operation for routine postop care. No acute issues. 

Tolerated a CLD, pain tolerable, ambulated. DC home in stable condition.


Disposition: DC-01 TO HOME OR SELFCARE





Core Measure Documentation





- Palliative Care


Palliative Care/ Comfort Measures: Not Applicable





- Core Measures


Any of the following diagnoses?: none





- VTE Discharge Requirements


Deep Vein Thrombosis/Pulmonary Embolism Present on Admission: No





- Acute MI Discharge Requirements


Aspirin at discharge: No


Reason for no aspirin on DC: Surgical contraindication





- Heart Failure Discharge Requirements


ACE/ARB for LVSD if EF <40%: Not Applicable





- Stroke Discharge Requirements


Statin for LDL = or >70 mg/dl on DC: Not Applicable





Exam





- Physical Exam


Narrative exam: 





Gen: AAO, NAD


Heart: RRR


Lungs: CTAB, no wheezes, no rales


Abd: Obese, soft, NT, ND. Bandages c/d/i. 


Ext: No LE edema.





- Constitutional


Vitals: 


                                        











Temp Pulse Resp BP Pulse Ox


 


 97.9 F   53 L  18   106/58   97 


 


 04/17/19 07:10  04/17/19 07:10  04/17/19 07:10  04/17/19 07:10  04/17/19 07:10














Plan


Diet: clear liquids


Wound: keep clean and dry


Special Instructions: no heavy lifting


Additional Instructions: Bunny Harding as scheduled


Follow up with: 


KAYKAY FONTANA DO [Primary Care Provider] - 7 Days

## 2019-07-05 ENCOUNTER — HOSPITAL ENCOUNTER (OUTPATIENT)
Dept: HOSPITAL 5 - SPVWC | Age: 54
Discharge: HOME | End: 2019-07-05
Attending: INTERNAL MEDICINE
Payer: COMMERCIAL

## 2019-07-05 DIAGNOSIS — K21.9: ICD-10-CM

## 2019-07-05 DIAGNOSIS — Z90.710: ICD-10-CM

## 2019-07-05 DIAGNOSIS — E11.9: ICD-10-CM

## 2019-07-05 DIAGNOSIS — Z12.31: Primary | ICD-10-CM

## 2019-07-05 DIAGNOSIS — E03.9: ICD-10-CM

## 2019-07-05 DIAGNOSIS — I10: ICD-10-CM

## 2019-07-05 PROCEDURE — 77067 SCR MAMMO BI INCL CAD: CPT

## 2019-07-05 NOTE — MAMMOGRAPHY REPORT
DIGITAL SCREENING MAMMOGRAM WITH CAD



INDICATION: Routine screening mammography. 



TECHNIQUE:  Digital bilateral  2D mammography was obtained in the craniocaudal and mediolateral obliq
ue projections. This examination was interpreted with the benefit of Computer-Aided Detection analysi
s.



COMPARISON: 12/28/2017



FINDINGS: 



Breast Density: There are scattered areas of fibroglandular density.



There is no evidence of dominant mass, suspicious calcifications or architectural distortion in  eith
er breast.



IMPRESSION:



BI-RADS Category 1:  Negative. No mammographic evidence of malignancy.  Recommend routine screening m
ammography in one year.



A "normal" or negative report should not discourage follow up or biopsy of a clinically significant f
inding.



A written summary of these findings will be mailed to the patient. The patient will be entered into a
 mammography reporting system which will generate a reminder letter for the patient's next appointmen
t at the appropriate interval.



The American College of Radiology recommends yearly mammograms starting at age 40 and continuing as l
silvia as a woman is in good health.  Breast MRI is recommended for women with an approximate 20-25% or 
greater lifetime risk of breast cancer, including women with a strong family history of breast or ova
isatu cancer or who have been treated for Hodgkin's disease.



Signer Name: Timothy Valle MD 

Signed: 7/5/2019 2:09 PM

 Workstation Name: [id number]

## 2019-09-03 ENCOUNTER — HOSPITAL ENCOUNTER (EMERGENCY)
Dept: HOSPITAL 5 - ED | Age: 54
LOS: 1 days | Discharge: HOME | End: 2019-09-04
Payer: COMMERCIAL

## 2019-09-03 VITALS — SYSTOLIC BLOOD PRESSURE: 160 MMHG | DIASTOLIC BLOOD PRESSURE: 88 MMHG
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
## 2019-09-03 DIAGNOSIS — M19.90: ICD-10-CM

## 2019-09-03 DIAGNOSIS — S29.011A: Primary | ICD-10-CM

## 2019-09-03 DIAGNOSIS — Z90.710: ICD-10-CM

## 2019-09-03 DIAGNOSIS — S20.212A: ICD-10-CM
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
## 2019-09-03 DIAGNOSIS — Z98.890: ICD-10-CM

## 2019-09-03 DIAGNOSIS — Z79.899: ICD-10-CM

## 2019-09-03 DIAGNOSIS — Z91.048: ICD-10-CM
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
## 2019-09-03 DIAGNOSIS — I10: ICD-10-CM
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
## 2019-09-03 DIAGNOSIS — E11.9: ICD-10-CM

## 2019-09-03 PROCEDURE — 99283 EMERGENCY DEPT VISIT LOW MDM: CPT

## 2019-09-03 PROCEDURE — 71111 X-RAY EXAM RIBS/CHEST4/> VWS: CPT

## 2019-09-03 PROCEDURE — 96372 THER/PROPH/DIAG INJ SC/IM: CPT

## 2019-09-03 NOTE — EVENT NOTE
ED Screening Note


Date of service: 09/03/19


Time: 21:25


ED Screening Note: 


53 y o f presenst s/p upper back pain s/p fall on saturday states its not 

getting beeter


some pain with inhalation


no bruising





This initial assessment/diagnostic orders/clinical plan/treatment(s) is/are 

subject to change based on patients health status, clinical progression and re-

assessment by fellow clinical providers in the ED. Further treatment and workup 

at subsequent clinical providers discretion. Patient/guardian urged not to elope

from the ED as their condition may be serious if not clinically assessed and 

managed. 





Initial orders include: 


rib detail

## 2019-09-03 NOTE — XRAY REPORT
BILATERAL RIBS 4 VIEWS



INDICATION / CLINICAL INFORMATION:

pain.



COMPARISON: 

None available.



FINDINGS:



RIBS: No acute, displaced fracture or other acute abnormality.



LUNGS: No acute findings. No pneumothorax.



A stapled bowel anastomosis is seen in the left upper quadrant.



Signer Name: Godwin Liu MD 

Signed: 9/3/2019 10:57 PM

 Workstation Name: [id number]

## 2020-02-25 NOTE — EMERGENCY DEPARTMENT REPORT
ED Back Pain/Injury HPI





- General


Chief Complaint: Fall


Stated Complaint: L FLANK PAIN FROM FALL


Time Seen by Provider: 09/03/19 21:24


Source: patient


Limitations: No Limitations





- History of Present Illness


Initial Comments: 





Patient is a 53-year-old -American female with no past medical history 

who presents to the ED with complaint of acute onset persistent severe left 

lateral chest wall and rib cage, and mid posterior thoracic pain after she 

slipped and fell down the stairs and let them come back 4 days ago.  Patient 

states that she has been taking ibuprofen as needed for pain but states that in 

the last 2 days the pain has worsened.  Patient denies dyspnea, chest pain, 

shortness of breath, dizziness, neck pain, syncope, abdominal pain, nausea, 

vomiting, numbness and tingling of upper and lower extremities bilaterally or 

loss of consciousness, head or neck injuries and change in vision.


MD Complaint: back pain, fall, other (left lateral chest wall and rib cage pain)


-: Sudden, days(s) (4)


Similar Symptoms Previously: No


Place: home


Radiation: flank (left, mid back and left lateral chest wall)


Severity: severe


Severity scale (0 -10): 7


Quality: sharp, stabbing, aching


Consistency: constant


Improves With: none


Worsens With: movement, deep breaths/cough


Context: fall


Associated Symptoms: denies other symptoms, chest pain (left lateral chest 

wall).  denies: confusion, weakness, numbness, difficulty walking, cough, 

difficulty urinating, diaphoresis, incontinence, constipation, abdominal pain, 

loss of appetite, malaise, nausea/vomiting, seizure, shortness of breath, 

syncope, other


Treatments Prior to Arrival: NSAIDS





- Related Data


                                Home Medications











 Medication  Instructions  Recorded  Confirmed  Last Taken


 


Thyroid,Pork [Nature-Throid] 120 mg PO QDAY 12/29/17 04/12/19 04/16/19 07:30


 


Triamterene/Hydrochlorothiazid 1 tab PO DAILY 12/29/17 04/16/19 04/15/19





[Triamterene-Hctz 37.5-25 mg Cp]    


 


amLODIPine [Norvasc] 1 tab PO DAILY 12/29/17 04/12/19 04/16/19 07:30








                                  Previous Rx's











 Medication  Instructions  Recorded  Last Taken  Type


 


Ketorolac [Toradol] 10 mg PO Q8H PRN #20 tablet 09/04/19 Unknown Rx


 


tiZANidine [Zanaflex 4mg TAB] 4 mg PO Q8H PRN #21 tablet 09/04/19 Unknown Rx











                                    Allergies











Allergy/AdvReac Type Severity Reaction Status Date / Time


 


SILK TAPE AdvReac  Pulls Off Uncoded 04/12/19 16:26





   Skin  














ED Review of Systems


ROS: 


Stated complaint: L FLANK PAIN FROM FALL


Other details as noted in HPI





Constitutional: denies: chills, fever


Eyes: denies: eye pain, eye discharge, vision change


ENT: denies: ear pain, throat pain


Respiratory: denies: cough, shortness of breath, wheezing


Cardiovascular: chest pain (left lateral chest wall).  denies: palpitations


Endocrine: no symptoms reported.  denies: increased hunger, unexplained weight 

loss


Gastrointestinal: denies: abdominal pain, nausea, diarrhea


Genitourinary: denies: urgency, dysuria, discharge


Musculoskeletal: denies: back pain, joint swelling, arthralgia


Skin: denies: rash, lesions


Neurological: denies: headache, weakness, paresthesias


Psychiatric: denies: anxiety, depression


Hematological/Lymphatic: denies: easy bleeding, easy bruising





ED Past Medical Hx





- Past Medical History


Hx Hypertension: Yes


Hx Heart Attack/AMI: No


Hx Diabetes: Yes (type II, no meds 2007)


Hx Liver Disease: No


Hx Renal Disease: No


Additional medical history: thyroid problems, Arthritis





- Surgical History


Additional Surgical History: small bowel obstruction with lysisi of adhesions, 

Hysterectomy, Left knee surgery, gastric sleeve 5/19





- Social History


Smoking Status: Never Smoker





- Medications


Home Medications: 


                                Home Medications











 Medication  Instructions  Recorded  Confirmed  Last Taken  Type


 


Thyroid,Pork [Nature-Throid] 120 mg PO QDAY 12/29/17 04/12/19 04/16/19 07:30 

History


 


Triamterene/Hydrochlorothiazid 1 tab PO DAILY 12/29/17 04/16/19 04/15/19 History





[Triamterene-Hctz 37.5-25 mg Cp]     


 


amLODIPine [Norvasc] 1 tab PO DAILY 12/29/17 04/12/19 04/16/19 07:30 History


 


Ketorolac [Toradol] 10 mg PO Q8H PRN #20 tablet 09/04/19  Unknown Rx


 


tiZANidine [Zanaflex 4mg TAB] 4 mg PO Q8H PRN #21 tablet 09/04/19  Unknown Rx














ED Physical Exam





- General


Limitations: No Limitations


General appearance: alert, in no apparent distress





- Head


Head exam: Present: atraumatic, normocephalic, normal inspection





- Eye


Eye exam: Present: normal appearance, PERRL, EOMI.  Absent: scleral icterus, 

conjunctival injection, nystagmus


Pupils: Present: normal accommodation





- ENT


ENT exam: Present: normal exam, normal orophraynx, mucous membranes moist, TM's 

normal bilaterally, normal external ear exam





- Neck


Neck exam: Present: normal inspection, full ROM.  Absent: tenderness, 

meningismus, thyromegaly





- Respiratory


Respiratory exam: Present: normal lung sounds bilaterally, chest wall tenderness

(left lateral chest wall tenderness).  Absent: respiratory distress, wheezes, 

rhonchi, accessory muscle use, prolonged expiratory





- Cardiovascular


Cardiovascular Exam: Present: regular rate, normal rhythm, normal heart sounds. 

Absent: systolic murmur, diastolic murmur, rubs, gallop





- GI/Abdominal


GI/Abdominal exam: Present: soft, normal bowel sounds.  Absent: tenderness, 

guarding, rebound, hyperactive bowel sounds, organomegaly





- Rectal


Rectal exam: Present: deferred





- Extremities Exam


Extremities exam: Present: normal inspection, normal capillary refill





- Back Exam


Back exam: Present: normal inspection, full ROM, tenderness (palpable left mid-

posterior thoracic paraspinal tenderness), muscle spasm, paraspinal tenderness





- Neurological Exam


Neurological exam: Present: alert, oriented X3, CN II-XII intact, normal gait, 

reflexes normal





- Psychiatric


Psychiatric exam: Present: normal affect, normal mood





- Skin


Skin exam: Present: warm, dry, intact, normal color.  Absent: rash





ED Course





                                   Vital Signs











  09/03/19





  21:30


 


Temperature 98.4 F


 


Pulse Rate 68


 


Respiratory 18





Rate 


 


Blood Pressure 160/88





[Left] 


 


O2 Sat by Pulse 97





Oximetry 














- Reevaluation(s)


Reevaluation #1: 





09/04/19 00:19


This is a 53-year-old female who presented to the ED with left lateral chest 

wall and rib cage pain, left mid posterior thoracic pain for 4 days after she 

slipped and fell landed on left side on the floor with no loss of consciousness.

  In the ED, patient is alert and oriented 3 but is in pain.  Patient was 

treated for pain in the ED and left rib cage and chest x-rays showed no acute 

rib fractures, pneumothorax or cardiopulmonary abnormalities.  Patient was 

discharged home on pain medications and muscle relaxants and advised to follow-

up with her primary care physician in 5-7 days for reevaluation or return to the

 ED immediately if symptoms get worse.





ED Medical Decision Making





- Radiology Data


Radiology results: report reviewed, image reviewed





Chest x-ray with left ribs: No acute rib fractures, no pneumothorax or any acute

 cardiopulmonary abnormalities.





- Medical Decision Making





This is a 53-year-old female who presented to the ED with left lateral chest 

wall and rib cage pain, left mid posterior thoracic pain for 4 days after she 

slipped and fell landed on left side on the floor with no loss of consciousness.

  In the ED, patient is alert and oriented 3 but is in pain.  Patient was 

treated for pain in the ED and left rib cage and chest x-rays showed no acute 

rib fractures, pneumothorax or cardiopulmonary abnormalities.  Patient was 

discharged home on pain medications and muscle relaxants and advised to follow-

up with her primary care physician in 5-7 days for reevaluation or return to the

 ED immediately if symptoms get worse.





- Differential Diagnosis


rib fractures; pneumothorax; vertebrae fractures; muscle strain


Critical care attestation.: 


If time is entered above; I have spent that time in minutes in the direct care 

of this critically ill patient, excluding procedure time.








ED Disposition


Clinical Impression: 


 Strain of muscle at thorax level





Contusion of rib on left side


Qualifiers:


 Encounter type: initial encounter Qualified Code(s): S20.212A - Contusion of 

left front wall of thorax, initial encounter





Contusion of left chest wall


Qualifiers:


 Encounter type: initial encounter Qualified Code(s): S20.212A - Contusion of 

left front wall of thorax, initial encounter





Disposition: DC-01 TO HOME OR SELFCARE


Is pt being admited?: No


Does the pt Need Aspirin: No


Condition: Stable


Instructions:  Muscle Strain (ED), Contusion in Adults (ED)


Additional Instructions: 


Take medication with food, drink plenty of fluids and follow-up with your 

primary care physician in 5-7 days for reevaluation.  Return to the ED 

immediately if symptoms get worse.


Prescriptions: 


Ketorolac [Toradol] 10 mg PO Q8H PRN #20 tablet


 PRN Reason: Pain


tiZANidine [Zanaflex 4mg TAB] 4 mg PO Q8H PRN #21 tablet


 PRN Reason: Muscle Spasm


Referrals: 


FONTANA,KAYKAY AMRIT, DO [Primary Care Provider] - 3-5 Days


Time of Disposition: 00:22


Print Language: ENGLISH
Arthritis

## 2020-03-18 ENCOUNTER — HOSPITAL ENCOUNTER (OUTPATIENT)
Dept: HOSPITAL 5 - XRAY | Age: 55
Discharge: HOME | End: 2020-03-18
Attending: ORTHOPAEDIC SURGERY
Payer: COMMERCIAL

## 2020-03-18 DIAGNOSIS — M47.816: Primary | ICD-10-CM

## 2020-03-18 DIAGNOSIS — M41.86: ICD-10-CM

## 2020-03-18 DIAGNOSIS — M43.16: ICD-10-CM

## 2020-03-18 PROCEDURE — 72110 X-RAY EXAM L-2 SPINE 4/>VWS: CPT

## 2020-03-18 NOTE — XRAY REPORT
XR spine lumbosacral 4+V



INDICATION / CLINICAL INFORMATION:

M54.5 LOW BACK PAIN.



COMPARISON:

None available.

 

FINDINGS:

BONES/JOINT(S): No acute fracture or subluxation. Mild left convex scoliosis with apex at the L4-5 le
eris. Grade 1 degenerative anterolisthesis of L4 on L5 due to bilateral facet DJD. Mild diffuse spondy
losis with small anterior and lateral osteophytes without significant disc height loss.



SOFT TISSUES: No significant abnormality.



ADDITIONAL FINDINGS: None.







Signer Name: Morales Begum MD 

Signed: 3/18/2020 1:17 PM

Workstation Name: [id number]

## 2020-04-01 ENCOUNTER — HOSPITAL ENCOUNTER (OUTPATIENT)
Dept: HOSPITAL 5 - MRI | Age: 55
Discharge: HOME | End: 2020-04-01
Attending: NEUROLOGICAL SURGERY
Payer: COMMERCIAL

## 2020-04-01 DIAGNOSIS — M48.061: ICD-10-CM

## 2020-04-01 DIAGNOSIS — M51.27: Primary | ICD-10-CM

## 2020-04-01 PROCEDURE — 72148 MRI LUMBAR SPINE W/O DYE: CPT

## 2020-04-01 NOTE — MAGNETIC RESONANCE REPORT
MRI LUMBAR SPINE 4/1/2020 



INDICATION / CLINICAL INFORMATION:

M54.17Radiculopathy, lumbosacral region.



COMPARISON: 

5/9/2008



FINDINGS:

GENERAL OBSERVATIONS: Unenhanced MR images of the lumbar spine were obtained.



There is no evidence of acute abnormality. Vertebral body heights are well preserved. There is no lisa
dence of epidural fluid collection or mass.



LEVEL-BY-LEVEL ANALYSIS: 



L5-S1: Diffuse disc bulging is present, with a broad-based left paracentral disc protrusion. There is
 impingement on the left lateral recess, and possible compression of the traversing left S1 nerve aftab
t. On the prior exam from 2008, a similar finding was present.



L4-5: There is a grade 1 anterolisthesis present associated with moderate bilateral facet degenerativ
e changes and mild diffuse disc bulging. Mild central canal narrowing is present, with no evidence of
 lateralization. This has developed since the prior exam.



L3-4: Mild diffuse disc bulging, associated with a shallow left lateral disc protrusion. There is andres
e moderate narrowing of left neural foramen. Soft tissue planes around the exiting left L3 nerve root
 are preserved, however. This has developed since the prior exam.



L2-3: Unremarkable.



L1-2: Unremarkable.





BONE MARROW: No significant abnormality.



SPINAL CORD/CAUDA EQUINA:  Normal.



PARASPINAL SOFT TISSUES: No significant abnormality.











IMPRESSION:



1. Left paracentral disc protrusion at L5-S1. This has a similar appearance on the prior exam from 5/
9/2008.

2. Small left lateral disc protrusion at L3-4. This has developed since the prior exam.

3. grade 1 anterolisthesis at L4-5. This has developed since the prior exam.



Signer Name: Raul Harper MD 

Signed: 4/1/2020 8:43 AM

Workstation Name: [id number]

## 2020-10-30 ENCOUNTER — HOSPITAL ENCOUNTER (OUTPATIENT)
Dept: HOSPITAL 5 - MAMMO | Age: 55
Discharge: HOME | End: 2020-10-30
Attending: INTERNAL MEDICINE
Payer: COMMERCIAL

## 2020-10-30 DIAGNOSIS — Z12.31: Primary | ICD-10-CM

## 2020-10-30 PROCEDURE — 77067 SCR MAMMO BI INCL CAD: CPT

## 2020-10-30 NOTE — MAMMOGRAPHY REPORT
DIGITAL SCREENING MAMMOGRAM WITH CAD, 10/30/2020



INDICATION: Routine screening mammography.  SCREENING MAMMO





TECHNIQUE:  Digital bilateral  2D mammography was obtained in the craniocaudal and mediolateral obliq
ue projections. This examination was interpreted with the benefit of Computer-Aided Detection analysi
s.



COMPARISON: 7/5/2019



FINDINGS: 



Breast Density: There are scattered areas of fibroglandular density.



There is no evidence of dominant mass, suspicious calcifications or architectural distortion in eithe
r breast.



IMPRESSION:



Follow up recommendation: Routine yearly



BI-RADS Category 1:  Negative.



A "normal" or negative report should not discourage follow up or biopsy of a clinically significant f
inding.



A written summary of these findings will be mailed to the patient. The patient will be entered into a
 mammography reporting system which will generate a reminder letter for the patient's next appointmen
t at the appropriate interval.



The American College of Radiology recommends yearly mammograms starting at age 40 and continuing as l
silvia as a woman is in good health.  Breast MRI is recommended for women with an approximate 20-25% or 
greater lifetime risk of breast cancer, including women with a strong family history of breast or ova
isatu cancer or who have been treated for Hodgkin's disease.



Signer Name: Shine Kelly MD 

Signed: 10/30/2020 12:46 PM

Workstation Name: [facility]

## 2022-01-07 ENCOUNTER — HOSPITAL ENCOUNTER (OUTPATIENT)
Dept: HOSPITAL 5 - MAMMO | Age: 57
Discharge: HOME | End: 2022-01-07
Attending: INTERNAL MEDICINE
Payer: COMMERCIAL

## 2022-01-07 DIAGNOSIS — M81.0: ICD-10-CM

## 2022-01-07 DIAGNOSIS — Z12.31: Primary | ICD-10-CM

## 2022-01-07 PROCEDURE — 77067 SCR MAMMO BI INCL CAD: CPT

## 2022-01-07 PROCEDURE — 77080 DXA BONE DENSITY AXIAL: CPT

## 2022-01-07 NOTE — MAMMOGRAPHY REPORT
DIGITAL SCREENING MAMMOGRAM WITH CAD, 1/7/2022



CLINICAL INFORMATION / INDICATION: Routine screening mammography. SCREENING MAMMOGRAM



TECHNIQUE:  Digital bilateral 2D mammography was obtained in the craniocaudal and mediolateral obliqu
e projections. This examination was interpreted with the benefit of Computer-Aided Detection analysis
.



COMPARISON: 10/30/20, 07/05/19



FINDINGS: 



Breast Density: There are scattered areas of fibroglandular density.



No dominant mass, suspicious calcifications, or architectural distortion in either breast. 



Right breast benign-appearing nodularity is stable.

 

IMPRESSION: No mammographic evidence of malignancy. No significant change.



Follow up recommendation: Routine yearly



BI-RADS Category 2:  BENIGN.





-------------------------------------------------------------------------------------------

A "normal" or negative report should not discourage follow up or biopsy of a clinically significant f
inding.



A written summary of these findings will be mailed to the patient. The patient will be entered into a
 mammography reporting system which will generate a reminder letter for the patient's next appointmen
t at the appropriate interval.



The [employer] recommends yearly mammograms starting at age 40 and continuing as l
silvia as a woman is in good health.  Breast MRI is recommended for women with an approximate 20-25% or 
greater lifetime risk of breast cancer, including women with a strong family history of breast or ova
isatu cancer or who have been treated for Hodgkin's disease.



Signer Name: SHERRY Persaud MD 

Signed: 1/7/2022 11:55 AM

Workstation Name: [email or web address]

## 2022-01-07 NOTE — MAMMOGRAPHY REPORT
DEXA BONE DENSITY SCAN



INDICATION / CLINICAL INFORMATION: OSTEOPOROSIS.

56 years Female



COMPARISON: None available.



LUMBAR SPINE, L1-L4:

- Bone mineral density (BMD) = 1.093 g/cm2.

- T-score = 0.4 

- Change (%) since most recent prior (if available):  None available.



LEFT HIP, NECK :

- Bone mineral density (BMD) = 0.862 g/cm2.

- T-score = 0.1 

- Change (%) since most recent prior (if available):  None available.







IMPRESSION:

1. WHO Classification: Normal bone density. Fracture Risk: Not Increased. 

2. 10-Year Fracture Risk (FRAX) = Major Osteoporotic Not reported.% / Hip: Not reported.%





FRAX generally not reported for patients with normal or osteoporotic BMD, in non-steroid-treated [place]
ents younger than age 50, or in patients undergoing pharmacotherapy







=================================================================

BMD Reporting Guidelines (ISCD, 2015)

=================================================================

BMD Reporting in Postmenopausal Women and in Men Age 50 and Older

- T-scores are preferred.

- The WHO densitometric classification is applicable.



BMD Reporting in Females Prior to Menopause and in Males Younger Than Age 50

- Z-scores, not T-scores, are preferred. This is particularly important in children.

- A Z-score of -2.0 or lower is defined as below the expected range for age, and a Z-score above -2.0
 is within the expected range for age.

- Osteoporosis cannot be diagnosed in men under age 50 on the basis of BMD alone.

- The WHO diagnostic criteria may be applied to women in the menopausal transition.





http://www.iscd.org/official-positions/5947-gqwo-nkzmmack-positions-adult/





Signer Name: Adam Palmer MD 

Signed: 1/7/2022 10:09 AM

Workstation Name: [email or web address]